# Patient Record
Sex: MALE | Race: WHITE | NOT HISPANIC OR LATINO | URBAN - METROPOLITAN AREA
[De-identification: names, ages, dates, MRNs, and addresses within clinical notes are randomized per-mention and may not be internally consistent; named-entity substitution may affect disease eponyms.]

---

## 2021-07-05 ENCOUNTER — INPATIENT (INPATIENT)
Facility: HOSPITAL | Age: 36
LOS: 0 days | Discharge: HOME | DRG: 343 | End: 2021-07-06
Attending: SURGERY | Admitting: SURGERY
Payer: COMMERCIAL

## 2021-07-05 VITALS
HEIGHT: 72 IN | DIASTOLIC BLOOD PRESSURE: 75 MMHG | OXYGEN SATURATION: 98 % | WEIGHT: 171.96 LBS | SYSTOLIC BLOOD PRESSURE: 142 MMHG | TEMPERATURE: 98 F | RESPIRATION RATE: 18 BRPM | HEART RATE: 64 BPM

## 2021-07-05 LAB
ALBUMIN SERPL ELPH-MCNC: 4.2 G/DL — SIGNIFICANT CHANGE UP (ref 3.4–5)
ALP SERPL-CCNC: 51 U/L — SIGNIFICANT CHANGE UP (ref 40–120)
ALT FLD-CCNC: 32 U/L — SIGNIFICANT CHANGE UP (ref 12–42)
ANION GAP SERPL CALC-SCNC: 9 MMOL/L — SIGNIFICANT CHANGE UP (ref 9–16)
APPEARANCE UR: CLEAR — SIGNIFICANT CHANGE UP
APTT BLD: 32.7 SEC — SIGNIFICANT CHANGE UP (ref 27.5–35.5)
AST SERPL-CCNC: 24 U/L — SIGNIFICANT CHANGE UP (ref 15–37)
BASOPHILS # BLD AUTO: 0.03 K/UL — SIGNIFICANT CHANGE UP (ref 0–0.2)
BASOPHILS NFR BLD AUTO: 0.3 % — SIGNIFICANT CHANGE UP (ref 0–2)
BILIRUB SERPL-MCNC: 0.4 MG/DL — SIGNIFICANT CHANGE UP (ref 0.2–1.2)
BILIRUB UR-MCNC: NEGATIVE — SIGNIFICANT CHANGE UP
BUN SERPL-MCNC: 8 MG/DL — SIGNIFICANT CHANGE UP (ref 7–23)
CALCIUM SERPL-MCNC: 9.1 MG/DL — SIGNIFICANT CHANGE UP (ref 8.5–10.5)
CHLORIDE SERPL-SCNC: 103 MMOL/L — SIGNIFICANT CHANGE UP (ref 96–108)
CO2 SERPL-SCNC: 28 MMOL/L — SIGNIFICANT CHANGE UP (ref 22–31)
COLOR SPEC: YELLOW — SIGNIFICANT CHANGE UP
CREAT SERPL-MCNC: 0.93 MG/DL — SIGNIFICANT CHANGE UP (ref 0.5–1.3)
DIFF PNL FLD: NEGATIVE — SIGNIFICANT CHANGE UP
EOSINOPHIL # BLD AUTO: 0.04 K/UL — SIGNIFICANT CHANGE UP (ref 0–0.5)
EOSINOPHIL NFR BLD AUTO: 0.4 % — SIGNIFICANT CHANGE UP (ref 0–6)
GLUCOSE SERPL-MCNC: 134 MG/DL — HIGH (ref 70–99)
GLUCOSE UR QL: NEGATIVE — SIGNIFICANT CHANGE UP
HCT VFR BLD CALC: 41.1 % — SIGNIFICANT CHANGE UP (ref 39–50)
HGB BLD-MCNC: 14 G/DL — SIGNIFICANT CHANGE UP (ref 13–17)
IMM GRANULOCYTES NFR BLD AUTO: 0.4 % — SIGNIFICANT CHANGE UP (ref 0–1.5)
INR BLD: 1.04 — SIGNIFICANT CHANGE UP (ref 0.88–1.16)
KETONES UR-MCNC: NEGATIVE — SIGNIFICANT CHANGE UP
LACTATE SERPL-SCNC: 1.7 MMOL/L — SIGNIFICANT CHANGE UP (ref 0.4–2)
LEUKOCYTE ESTERASE UR-ACNC: NEGATIVE — SIGNIFICANT CHANGE UP
LYMPHOCYTES # BLD AUTO: 1.06 K/UL — SIGNIFICANT CHANGE UP (ref 1–3.3)
LYMPHOCYTES # BLD AUTO: 9.3 % — LOW (ref 13–44)
MCHC RBC-ENTMCNC: 30.3 PG — SIGNIFICANT CHANGE UP (ref 27–34)
MCHC RBC-ENTMCNC: 34.1 GM/DL — SIGNIFICANT CHANGE UP (ref 32–36)
MCV RBC AUTO: 89 FL — SIGNIFICANT CHANGE UP (ref 80–100)
MONOCYTES # BLD AUTO: 0.49 K/UL — SIGNIFICANT CHANGE UP (ref 0–0.9)
MONOCYTES NFR BLD AUTO: 4.3 % — SIGNIFICANT CHANGE UP (ref 2–14)
NEUTROPHILS # BLD AUTO: 9.74 K/UL — HIGH (ref 1.8–7.4)
NEUTROPHILS NFR BLD AUTO: 85.3 % — HIGH (ref 43–77)
NITRITE UR-MCNC: NEGATIVE — SIGNIFICANT CHANGE UP
NRBC # BLD: 0 /100 WBCS — SIGNIFICANT CHANGE UP (ref 0–0)
PH UR: 6 — SIGNIFICANT CHANGE UP (ref 5–8)
PLATELET # BLD AUTO: 197 K/UL — SIGNIFICANT CHANGE UP (ref 150–400)
POTASSIUM SERPL-MCNC: 4.3 MMOL/L — SIGNIFICANT CHANGE UP (ref 3.5–5.3)
POTASSIUM SERPL-SCNC: 4.3 MMOL/L — SIGNIFICANT CHANGE UP (ref 3.5–5.3)
PROT SERPL-MCNC: 7.8 G/DL — SIGNIFICANT CHANGE UP (ref 6.4–8.2)
PROT UR-MCNC: NEGATIVE MG/DL — SIGNIFICANT CHANGE UP
PROTHROM AB SERPL-ACNC: 12.2 SEC — SIGNIFICANT CHANGE UP (ref 10.6–13.6)
RBC # BLD: 4.62 M/UL — SIGNIFICANT CHANGE UP (ref 4.2–5.8)
RBC # FLD: 11.6 % — SIGNIFICANT CHANGE UP (ref 10.3–14.5)
SARS-COV-2 RNA SPEC QL NAA+PROBE: SIGNIFICANT CHANGE UP
SODIUM SERPL-SCNC: 140 MMOL/L — SIGNIFICANT CHANGE UP (ref 132–145)
SP GR SPEC: <=1.005 — SIGNIFICANT CHANGE UP (ref 1–1.03)
UROBILINOGEN FLD QL: 0.2 E.U./DL — SIGNIFICANT CHANGE UP
WBC # BLD: 11.41 K/UL — HIGH (ref 3.8–10.5)
WBC # FLD AUTO: 11.41 K/UL — HIGH (ref 3.8–10.5)

## 2021-07-05 PROCEDURE — 74177 CT ABD & PELVIS W/CONTRAST: CPT | Mod: 26

## 2021-07-05 PROCEDURE — 99285 EMERGENCY DEPT VISIT HI MDM: CPT

## 2021-07-05 RX ORDER — SODIUM CHLORIDE 9 MG/ML
1000 INJECTION INTRAMUSCULAR; INTRAVENOUS; SUBCUTANEOUS
Refills: 0 | Status: DISCONTINUED | OUTPATIENT
Start: 2021-07-05 | End: 2021-07-06

## 2021-07-05 RX ORDER — CEFOTETAN DISODIUM 1 G
2 VIAL (EA) INJECTION ONCE
Refills: 0 | Status: COMPLETED | OUTPATIENT
Start: 2021-07-05 | End: 2021-07-05

## 2021-07-05 RX ORDER — MORPHINE SULFATE 50 MG/1
4 CAPSULE, EXTENDED RELEASE ORAL ONCE
Refills: 0 | Status: DISCONTINUED | OUTPATIENT
Start: 2021-07-05 | End: 2021-07-05

## 2021-07-05 RX ORDER — SODIUM CHLORIDE 9 MG/ML
1000 INJECTION INTRAMUSCULAR; INTRAVENOUS; SUBCUTANEOUS ONCE
Refills: 0 | Status: COMPLETED | OUTPATIENT
Start: 2021-07-05 | End: 2021-07-05

## 2021-07-05 RX ORDER — KETOROLAC TROMETHAMINE 30 MG/ML
15 SYRINGE (ML) INJECTION ONCE
Refills: 0 | Status: DISCONTINUED | OUTPATIENT
Start: 2021-07-05 | End: 2021-07-05

## 2021-07-05 RX ORDER — IOHEXOL 300 MG/ML
30 INJECTION, SOLUTION INTRAVENOUS ONCE
Refills: 0 | Status: COMPLETED | OUTPATIENT
Start: 2021-07-05 | End: 2021-07-05

## 2021-07-05 RX ADMIN — IOHEXOL 30 MILLILITER(S): 300 INJECTION, SOLUTION INTRAVENOUS at 17:35

## 2021-07-05 RX ADMIN — Medication 100 GRAM(S): at 22:33

## 2021-07-05 RX ADMIN — MORPHINE SULFATE 4 MILLIGRAM(S): 50 CAPSULE, EXTENDED RELEASE ORAL at 21:43

## 2021-07-05 RX ADMIN — SODIUM CHLORIDE 100 MILLILITER(S): 9 INJECTION INTRAMUSCULAR; INTRAVENOUS; SUBCUTANEOUS at 22:33

## 2021-07-05 RX ADMIN — MORPHINE SULFATE 4 MILLIGRAM(S): 50 CAPSULE, EXTENDED RELEASE ORAL at 22:41

## 2021-07-05 RX ADMIN — MORPHINE SULFATE 4 MILLIGRAM(S): 50 CAPSULE, EXTENDED RELEASE ORAL at 17:35

## 2021-07-05 RX ADMIN — SODIUM CHLORIDE 1000 MILLILITER(S): 9 INJECTION INTRAMUSCULAR; INTRAVENOUS; SUBCUTANEOUS at 17:35

## 2021-07-05 NOTE — ED PROVIDER NOTE - OBJECTIVE STATEMENT
36 yo male, present to the ER with his partner,  with one day of abdominal pain started in umbilical region now in RLQ  + nausea no vomiting no fever   no prior abd surgery

## 2021-07-05 NOTE — ED ADULT TRIAGE NOTE - CHIEF COMPLAINT QUOTE
sent in from [facility] for further evaluation of sudden onset of periumbilical pain, which has developed to generalized abdominal pain with nausea. denies vomiting or diarrhea

## 2021-07-05 NOTE — ED ADULT NURSE REASSESSMENT NOTE - NS ED NURSE REASSESS COMMENT FT1
Pt rcvd from [facility].  Pt w/ confirmed appy.  Pt pending abx and fluids and tx.  Will reassess and reevaluate.

## 2021-07-05 NOTE — ED PROVIDER NOTE - CLINICAL SUMMARY MEDICAL DECISION MAKING FREE TEXT BOX
@8pm the patient was endorsed to Dr. Pederson to check ct a/p for r/o appendicitis.   at that time,  the patient was comfortable - no progression of symptoms.

## 2021-07-06 VITALS
OXYGEN SATURATION: 100 % | HEART RATE: 74 BPM | SYSTOLIC BLOOD PRESSURE: 132 MMHG | DIASTOLIC BLOOD PRESSURE: 76 MMHG | RESPIRATION RATE: 16 BRPM | TEMPERATURE: 98 F

## 2021-07-06 LAB
AMPHET UR-MCNC: NEGATIVE — SIGNIFICANT CHANGE UP
ANION GAP SERPL CALC-SCNC: 9 MMOL/L — SIGNIFICANT CHANGE UP (ref 5–17)
BARBITURATES UR SCN-MCNC: NEGATIVE — SIGNIFICANT CHANGE UP
BASOPHILS # BLD AUTO: 0.02 K/UL — SIGNIFICANT CHANGE UP (ref 0–0.2)
BASOPHILS NFR BLD AUTO: 0.2 % — SIGNIFICANT CHANGE UP (ref 0–2)
BENZODIAZ UR-MCNC: NEGATIVE — SIGNIFICANT CHANGE UP
BLD GP AB SCN SERPL QL: NEGATIVE — SIGNIFICANT CHANGE UP
BUN SERPL-MCNC: 6 MG/DL — LOW (ref 7–23)
CALCIUM SERPL-MCNC: 9.3 MG/DL — SIGNIFICANT CHANGE UP (ref 8.4–10.5)
CHLORIDE SERPL-SCNC: 102 MMOL/L — SIGNIFICANT CHANGE UP (ref 96–108)
CO2 SERPL-SCNC: 29 MMOL/L — SIGNIFICANT CHANGE UP (ref 22–31)
COCAINE METAB.OTHER UR-MCNC: NEGATIVE — SIGNIFICANT CHANGE UP
COVID-19 SPIKE DOMAIN AB INTERP: POSITIVE
COVID-19 SPIKE DOMAIN ANTIBODY RESULT: >250 U/ML — HIGH
CREAT SERPL-MCNC: 0.82 MG/DL — SIGNIFICANT CHANGE UP (ref 0.5–1.3)
EOSINOPHIL # BLD AUTO: 0.07 K/UL — SIGNIFICANT CHANGE UP (ref 0–0.5)
EOSINOPHIL NFR BLD AUTO: 0.6 % — SIGNIFICANT CHANGE UP (ref 0–6)
GLUCOSE SERPL-MCNC: 111 MG/DL — HIGH (ref 70–99)
HCT VFR BLD CALC: 41.3 % — SIGNIFICANT CHANGE UP (ref 39–50)
HGB BLD-MCNC: 13.7 G/DL — SIGNIFICANT CHANGE UP (ref 13–17)
IMM GRANULOCYTES NFR BLD AUTO: 0.3 % — SIGNIFICANT CHANGE UP (ref 0–1.5)
LYMPHOCYTES # BLD AUTO: 1.58 K/UL — SIGNIFICANT CHANGE UP (ref 1–3.3)
LYMPHOCYTES # BLD AUTO: 13.8 % — SIGNIFICANT CHANGE UP (ref 13–44)
MAGNESIUM SERPL-MCNC: 1.8 MG/DL — SIGNIFICANT CHANGE UP (ref 1.6–2.6)
MCHC RBC-ENTMCNC: 29.7 PG — SIGNIFICANT CHANGE UP (ref 27–34)
MCHC RBC-ENTMCNC: 33.2 GM/DL — SIGNIFICANT CHANGE UP (ref 32–36)
MCV RBC AUTO: 89.4 FL — SIGNIFICANT CHANGE UP (ref 80–100)
METHADONE UR-MCNC: NEGATIVE — SIGNIFICANT CHANGE UP
MONOCYTES # BLD AUTO: 1.01 K/UL — HIGH (ref 0–0.9)
MONOCYTES NFR BLD AUTO: 8.8 % — SIGNIFICANT CHANGE UP (ref 2–14)
NEUTROPHILS # BLD AUTO: 8.76 K/UL — HIGH (ref 1.8–7.4)
NEUTROPHILS NFR BLD AUTO: 76.3 % — SIGNIFICANT CHANGE UP (ref 43–77)
NRBC # BLD: 0 /100 WBCS — SIGNIFICANT CHANGE UP (ref 0–0)
OPIATES UR-MCNC: POSITIVE
PCP SPEC-MCNC: SIGNIFICANT CHANGE UP
PCP UR-MCNC: NEGATIVE — SIGNIFICANT CHANGE UP
PHOSPHATE SERPL-MCNC: 3.5 MG/DL — SIGNIFICANT CHANGE UP (ref 2.5–4.5)
PLATELET # BLD AUTO: 181 K/UL — SIGNIFICANT CHANGE UP (ref 150–400)
POTASSIUM SERPL-MCNC: 4.8 MMOL/L — SIGNIFICANT CHANGE UP (ref 3.5–5.3)
POTASSIUM SERPL-SCNC: 4.8 MMOL/L — SIGNIFICANT CHANGE UP (ref 3.5–5.3)
RBC # BLD: 4.62 M/UL — SIGNIFICANT CHANGE UP (ref 4.2–5.8)
RBC # FLD: 11.9 % — SIGNIFICANT CHANGE UP (ref 10.3–14.5)
RH IG SCN BLD-IMP: POSITIVE — SIGNIFICANT CHANGE UP
RH IG SCN BLD-IMP: POSITIVE — SIGNIFICANT CHANGE UP
SARS-COV-2 IGG+IGM SERPL QL IA: >250 U/ML — HIGH
SARS-COV-2 IGG+IGM SERPL QL IA: POSITIVE
SODIUM SERPL-SCNC: 140 MMOL/L — SIGNIFICANT CHANGE UP (ref 135–145)
THC UR QL: NEGATIVE — SIGNIFICANT CHANGE UP
WBC # BLD: 11.48 K/UL — HIGH (ref 3.8–10.5)
WBC # FLD AUTO: 11.48 K/UL — HIGH (ref 3.8–10.5)

## 2021-07-06 PROCEDURE — 74177 CT ABD & PELVIS W/CONTRAST: CPT

## 2021-07-06 PROCEDURE — 36415 COLL VENOUS BLD VENIPUNCTURE: CPT

## 2021-07-06 PROCEDURE — 87635 SARS-COV-2 COVID-19 AMP PRB: CPT

## 2021-07-06 PROCEDURE — 71045 X-RAY EXAM CHEST 1 VIEW: CPT | Mod: 26

## 2021-07-06 PROCEDURE — 85730 THROMBOPLASTIN TIME PARTIAL: CPT

## 2021-07-06 PROCEDURE — 80307 DRUG TEST PRSMV CHEM ANLYZR: CPT

## 2021-07-06 PROCEDURE — 85025 COMPLETE CBC W/AUTO DIFF WBC: CPT

## 2021-07-06 PROCEDURE — 80053 COMPREHEN METABOLIC PANEL: CPT

## 2021-07-06 PROCEDURE — 86769 SARS-COV-2 COVID-19 ANTIBODY: CPT

## 2021-07-06 PROCEDURE — 83605 ASSAY OF LACTIC ACID: CPT

## 2021-07-06 PROCEDURE — 86850 RBC ANTIBODY SCREEN: CPT

## 2021-07-06 PROCEDURE — 83735 ASSAY OF MAGNESIUM: CPT

## 2021-07-06 PROCEDURE — 81003 URINALYSIS AUTO W/O SCOPE: CPT

## 2021-07-06 PROCEDURE — 86900 BLOOD TYPING SEROLOGIC ABO: CPT

## 2021-07-06 PROCEDURE — 80048 BASIC METABOLIC PNL TOTAL CA: CPT

## 2021-07-06 PROCEDURE — 88304 TISSUE EXAM BY PATHOLOGIST: CPT

## 2021-07-06 PROCEDURE — 84100 ASSAY OF PHOSPHORUS: CPT

## 2021-07-06 PROCEDURE — 86901 BLOOD TYPING SEROLOGIC RH(D): CPT

## 2021-07-06 PROCEDURE — C1889: CPT

## 2021-07-06 PROCEDURE — 99285 EMERGENCY DEPT VISIT HI MDM: CPT

## 2021-07-06 PROCEDURE — 85610 PROTHROMBIN TIME: CPT

## 2021-07-06 PROCEDURE — 88304 TISSUE EXAM BY PATHOLOGIST: CPT | Mod: 26

## 2021-07-06 PROCEDURE — 71045 X-RAY EXAM CHEST 1 VIEW: CPT

## 2021-07-06 RX ORDER — OXYCODONE AND ACETAMINOPHEN 5; 325 MG/1; MG/1
1 TABLET ORAL EVERY 4 HOURS
Refills: 0 | Status: DISCONTINUED | OUTPATIENT
Start: 2021-07-06 | End: 2021-07-06

## 2021-07-06 RX ORDER — CEFTRIAXONE 500 MG/1
1000 INJECTION, POWDER, FOR SOLUTION INTRAMUSCULAR; INTRAVENOUS EVERY 24 HOURS
Refills: 0 | Status: DISCONTINUED | OUTPATIENT
Start: 2021-07-07 | End: 2021-07-06

## 2021-07-06 RX ORDER — CEFTRIAXONE 500 MG/1
1000 INJECTION, POWDER, FOR SOLUTION INTRAMUSCULAR; INTRAVENOUS EVERY 24 HOURS
Refills: 0 | Status: DISCONTINUED | OUTPATIENT
Start: 2021-07-06 | End: 2021-07-06

## 2021-07-06 RX ORDER — OXYCODONE HYDROCHLORIDE 5 MG/1
1 TABLET ORAL
Qty: 12 | Refills: 0
Start: 2021-07-06 | End: 2021-07-08

## 2021-07-06 RX ORDER — ONDANSETRON 8 MG/1
4 TABLET, FILM COATED ORAL EVERY 6 HOURS
Refills: 0 | Status: DISCONTINUED | OUTPATIENT
Start: 2021-07-06 | End: 2021-07-06

## 2021-07-06 RX ORDER — SODIUM CHLORIDE 9 MG/ML
1000 INJECTION, SOLUTION INTRAVENOUS
Refills: 0 | Status: DISCONTINUED | OUTPATIENT
Start: 2021-07-06 | End: 2021-07-06

## 2021-07-06 RX ORDER — HYDROMORPHONE HYDROCHLORIDE 2 MG/ML
0.5 INJECTION INTRAMUSCULAR; INTRAVENOUS; SUBCUTANEOUS
Refills: 0 | Status: DISCONTINUED | OUTPATIENT
Start: 2021-07-06 | End: 2021-07-06

## 2021-07-06 RX ORDER — HYDROMORPHONE HYDROCHLORIDE 2 MG/ML
0.5 INJECTION INTRAMUSCULAR; INTRAVENOUS; SUBCUTANEOUS EVERY 6 HOURS
Refills: 0 | Status: DISCONTINUED | OUTPATIENT
Start: 2021-07-06 | End: 2021-07-06

## 2021-07-06 RX ORDER — HYDROMORPHONE HYDROCHLORIDE 2 MG/ML
0.25 INJECTION INTRAMUSCULAR; INTRAVENOUS; SUBCUTANEOUS EVERY 6 HOURS
Refills: 0 | Status: DISCONTINUED | OUTPATIENT
Start: 2021-07-06 | End: 2021-07-06

## 2021-07-06 RX ORDER — HEPARIN SODIUM 5000 [USP'U]/ML
5000 INJECTION INTRAVENOUS; SUBCUTANEOUS EVERY 8 HOURS
Refills: 0 | Status: DISCONTINUED | OUTPATIENT
Start: 2021-07-06 | End: 2021-07-06

## 2021-07-06 RX ORDER — METRONIDAZOLE 500 MG
500 TABLET ORAL EVERY 8 HOURS
Refills: 0 | Status: DISCONTINUED | OUTPATIENT
Start: 2021-07-06 | End: 2021-07-06

## 2021-07-06 RX ORDER — OXYCODONE AND ACETAMINOPHEN 5; 325 MG/1; MG/1
2 TABLET ORAL EVERY 4 HOURS
Refills: 0 | Status: DISCONTINUED | OUTPATIENT
Start: 2021-07-06 | End: 2021-07-06

## 2021-07-06 RX ORDER — ACETAMINOPHEN 500 MG
1000 TABLET ORAL EVERY 6 HOURS
Refills: 0 | Status: COMPLETED | OUTPATIENT
Start: 2021-07-06 | End: 2021-07-06

## 2021-07-06 RX ORDER — MAGNESIUM SULFATE 500 MG/ML
1 VIAL (ML) INJECTION ONCE
Refills: 0 | Status: DISCONTINUED | OUTPATIENT
Start: 2021-07-06 | End: 2021-07-06

## 2021-07-06 RX ORDER — FINASTERIDE 5 MG/1
1 TABLET, FILM COATED ORAL
Qty: 0 | Refills: 0 | DISCHARGE

## 2021-07-06 RX ADMIN — HYDROMORPHONE HYDROCHLORIDE 0.5 MILLIGRAM(S): 2 INJECTION INTRAMUSCULAR; INTRAVENOUS; SUBCUTANEOUS at 11:50

## 2021-07-06 RX ADMIN — OXYCODONE AND ACETAMINOPHEN 1 TABLET(S): 5; 325 TABLET ORAL at 15:14

## 2021-07-06 RX ADMIN — Medication 400 MILLIGRAM(S): at 01:11

## 2021-07-06 RX ADMIN — SODIUM CHLORIDE 120 MILLILITER(S): 9 INJECTION, SOLUTION INTRAVENOUS at 01:11

## 2021-07-06 RX ADMIN — HYDROMORPHONE HYDROCHLORIDE 0.5 MILLIGRAM(S): 2 INJECTION INTRAMUSCULAR; INTRAVENOUS; SUBCUTANEOUS at 11:30

## 2021-07-06 RX ADMIN — Medication 100 MILLIGRAM(S): at 13:25

## 2021-07-06 RX ADMIN — HYDROMORPHONE HYDROCHLORIDE 0.25 MILLIGRAM(S): 2 INJECTION INTRAMUSCULAR; INTRAVENOUS; SUBCUTANEOUS at 05:08

## 2021-07-06 RX ADMIN — HYDROMORPHONE HYDROCHLORIDE 0.25 MILLIGRAM(S): 2 INJECTION INTRAMUSCULAR; INTRAVENOUS; SUBCUTANEOUS at 05:28

## 2021-07-06 RX ADMIN — CEFTRIAXONE 100 MILLIGRAM(S): 500 INJECTION, POWDER, FOR SOLUTION INTRAMUSCULAR; INTRAVENOUS at 03:00

## 2021-07-06 RX ADMIN — HEPARIN SODIUM 5000 UNIT(S): 5000 INJECTION INTRAVENOUS; SUBCUTANEOUS at 13:25

## 2021-07-06 RX ADMIN — OXYCODONE AND ACETAMINOPHEN 1 TABLET(S): 5; 325 TABLET ORAL at 16:14

## 2021-07-06 RX ADMIN — Medication 1000 MILLIGRAM(S): at 01:41

## 2021-07-06 RX ADMIN — HEPARIN SODIUM 5000 UNIT(S): 5000 INJECTION INTRAVENOUS; SUBCUTANEOUS at 05:08

## 2021-07-06 RX ADMIN — Medication 100 MILLIGRAM(S): at 05:08

## 2021-07-06 NOTE — DISCHARGE NOTE NURSING/CASE MANAGEMENT/SOCIAL WORK - PATIENT PORTAL LINK FT
You can access the FollowMyHealth Patient Portal offered by [facility] by registering at the following website: [email or web address]. By joining [facility] FollowMyHealth portal, you will also be able to view your health information using other applications (apps) compatible with our system.

## 2021-07-06 NOTE — H&P ADULT - NSHPPHYSICALEXAM_GEN_ALL_CORE
Physical Exam:  General: NAD, resting comfortably in bed  Pulmonary: Nonlabored, no respiratory distress  Cardiovascular: regular rate and rhythm   Abdominal: soft, not distended. Minimally tender in LUQ/RUQ, Significant pain to palpation in RLQ. +Rovsing sign.   Extremities: WWP, normal strength  Neuro: A/O x 3, no focal deficits, normal motor/sensation  Pulses: palpable distal pulses

## 2021-07-06 NOTE — H&P ADULT - HISTORY OF PRESENT ILLNESS
Mr. Sevilla is a 36yo male with no sig PMH or PSurHX  who presented to the [facility] ED with a 1 day history of abdominal pain rated 9/10, sharp, beginning in upper abdomen region radiating to the RLQ. Pain initially awoke patient from sleep last evening. Endorses nausea without vomiting and anorexia. Denies Fever, chills, headache, chest pain, dyspnea, diarrhea, constipation, or recent weight loss. Has never experienced similar symptoms. Recent travel from [place]. No sick contacts. Never had a colonoscopy or EGD.    PMHx: none  PSHx: none  Meds: propecia  Allergies: NKDA  Social Hx: never smoker. Occasional ETOH use. Occasional cocaine use, >48hrs ago. Works as an .   Family Hx: Mom and Dad both alive, Dad: heart disease. No family history of IBD    In the ED, patient was hemodynamically stable T98.1 HR 64 142/65. Laboratory studies significant for WBC of 11k, LA1.7, CTAP demonstrating thickened appending up to 0.8 cm with periappendiceal fat stranding and free fluid. No appendicolith appreciated. No periappendiceal abscess collection or extraluminal gas. Administered cefotetan, morphine 4mg x2 and 1.5L NS bolus.       Plan  Admit to inpatient, Surgery Team 4, Dr. Shearer  NPO/IVF  Ceftriaxone/MTD  Pain/nausea control  SCD/SQH  IS/OOBAT  Type and Screen  AM Labs  Add on for OR Mr. Sevilla is a 34yo male with no sig PMH or PSurHX  who presented to the [facility] ED with a 1 day history of abdominal pain rated 9/10, sharp, beginning in upper abdomen region radiating to the RLQ. Pain initially awoke patient from sleep last evening. Endorses nausea without vomiting and anorexia. Denies Fever, chills, headache, chest pain, dyspnea, diarrhea, constipation, or recent weight loss. Has never experienced similar symptoms. Recent travel from [place]. No sick contacts. Never had a colonoscopy or EGD.    PMHx: none  PSHx: none  Meds: propecia  Allergies: NKDA  Social Hx: never smoker. Occasional ETOH use. Occasional cocaine use, >48hrs ago. Works as an .   Family Hx: Mom and Dad both alive, Dad: heart disease. No family history of IBD    In the ED, patient was hemodynamically stable T98.1 HR 64 142/65. Laboratory studies significant for WBC of 11k, LA1.7, CTAP demonstrating thickened appending up to 0.8 cm with periappendiceal fat stranding and free fluid. No appendicolith noted. Administered cefotetan, morphine 4mg x2 and 1.5L NS bolus.

## 2021-07-06 NOTE — DISCHARGE NOTE PROVIDER - NSDCMRMEDTOKEN_GEN_ALL_CORE_FT
Propecia 1 mg oral tablet: 1 tab(s) orally once a day   amoxicillin-clavulanate 875 mg-125 mg oral tablet: 1 tab(s) orally 2 times a day  Oxaydo 5 mg oral tablet: 1 tab(s) orally every 6 hours, As Needed -for severe pain  -for severe pain MDD:4 tablets   Propecia 1 mg oral tablet: 1 tab(s) orally once a day   amoxicillin-clavulanate 875 mg-125 mg oral tablet: 1 tab(s) orally 2 times a day  oxyCODONE 5 mg oral tablet: 1 tab(s) orally every 6 hours, As Needed -for severe pain MDD:4   Propecia 1 mg oral tablet: 1 tab(s) orally once a day

## 2021-07-06 NOTE — H&P ADULT - ASSESSMENT
Mr. Sevilla is a 36yo male with no sig PMH or PSurgHx who presented to the [facility] ED with a 1 day history of abdominal pain, nausea without vomiting and anorexia. Pt is HDS, exam significant for pain in the RLQ, positive Rovsing sign. Laboratory studies significant for WBC of 11k, LA1.7, CTAP consistent with acute appendicitis without abscess or appendicolith. Impression is acute appendicitis.     Plan  Admit to inpatient, Surgery Team 4, Dr. Shearer  NPO/IVF  Ceftriaxone/MTD  Pain/nausea control  SCD/SQH  IS/OOBAT  Type and Screen  EKG  AM Labs  Add on for OR   Mr. Sevilla is a 36yo male with no sig PMH or PSurgHx who presented to the [facility] ED with a 1 day history of abdominal pain, nausea without vomiting and anorexia. Pt is HDS, exam significant for pain in the RLQ, positive Rovsing sign. Laboratory studies significant for WBC of 11k, LA1.7, CTAP consistent with acute appendicitis without abscess or appendicolith. Impression is acute appendicitis.     Plan  Admit to inpatient, Surgery Team 4, Dr. Shearer  NPO/IVF  Ceftriaxone/MTD  Pain/nausea control  SCD/SQH  IS/OOBAT  Type and Screen  EKG  AM Labs  Add on for OR    Patient s/d/e with Chief Resident   Mr. Sevilla is a 36yo male with no sig PMH or PSurgHx who presented to the [facility] ED with a 1 day history of abdominal pain, nausea without vomiting and anorexia. Pt is HDS, exam significant for pain in the RLQ, positive Rovsing sign. Laboratory studies significant for WBC of 11k, LA1.7, CTAP consistent with acute appendicitis without abscess or appendicolith. Impression is acute appendicitis.     Plan  Admit to inpatient, Surgery Team 4, Dr. Seharer  NPO/IVF  Ceftriaxone/MTD  Pain/nausea control  SCD/SQH  IS/OOBAT  Type and Screen  EKG  CXR  AM Labs  Add on for OR    Patient s/d/e with Chief Resident

## 2021-07-06 NOTE — BRIEF OPERATIVE NOTE - OPERATION/FINDINGS
Patient was brought to the operating room on 7/6 for an appendectomy.  Appendix was found to be inflamed and attached to the ascending colon. It was carefully dissected and mobilized away from the colon. The mesentery was divided with a ligasure and the appendix was stapled at the base with a 45 mm purple load stapler. Hemostasis was achieved and the appendix was removed without incident. Port sites were closed and covered with steri-strips. Patient tolerated the procedure well with no known complications.

## 2021-07-06 NOTE — DISCHARGE NOTE PROVIDER - HOSPITAL COURSE
Mr. Sevilla is a 36yo male with no sig PMH or PSurHX  who presented to the [facility] ED with a 1 day history of abdominal pain rated 9/10, sharp, beginning in upper abdomen region radiating to the RLQ. Pain initially awoke patient from sleep. He endorsed nausea without vomiting and anorexia. Denied fever, chills, headache, chest pain, dyspnea, diarrhea, constipation, or recent weight loss. Laboratory studies demonstrated a WBC count of 11k, and a CT scan was consistent with a diagnosis of acute appendicitis. The patient was admitted to the surgical service, made nil per os, administered IV fluids and antibiotics and appropriate preoperative optimization was carried out. He proceeded to the operating room for laparoscopic appendectomy on 7/6. **********INCOMPLETE********* The patient's clinical picture has improved. Upon discharge he remains hemodynamically stable and is ready for discharge with instructions to follow up with the surgeon.      Mr. Sevilla is a 34yo male with no sig PMH or PSurHX  who presented to the [facility] ED with a 1 day history of abdominal pain rated 9/10, sharp, beginning in upper abdomen region radiating to the RLQ. Pain initially awoke patient from sleep. He endorsed nausea without vomiting and anorexia. Denied fever, chills, headache, chest pain, dyspnea, diarrhea, constipation, or recent weight loss. Laboratory studies demonstrated a WBC count of 11k, and a CT scan was consistent with a diagnosis of acute appendicitis. The patient was admitted to the surgical service, made nil per os, administered IV fluids and antibiotics and appropriate preoperative optimization was carried out. He proceeded to the operating room for laparoscopic appendectomy on 7/6. Patient tolerated the procedure well with no complications. Some inflammation of the appendix was noted and the decision was made to continue the patient on 4 days of outpatient antibiotics. The patient's clinical picture has improved. Upon discharge he remains hemodynamically stable and is ready for discharge with instructions to follow up with the surgeon.

## 2021-07-06 NOTE — CHART NOTE - NSCHARTNOTEFT_GEN_A_CORE
Patient seen post-op at bedside. Reports 3/10 abdominal pain that is well-controlled. Denies nausea, vomiting, fever, chills, CP, SOB. Abdomen is soft, nondistended, and appropriately tender. Incisions intact with slight oozing through steri strips. Team 4 Surgery Post-Op Note, PCN:     Pre-Op Dx:   Procedure: Appendectomy      Surgeon: Dr. Sherley Rossi    Subjective: Patient seen post-op at bedside. Reports 3/10 abdominal pain that is well-controlled. Denies nausea, vomiting, fever, chills, CP, SOB.    Vital Signs Last 24 Hrs  T(C): 36.9 (06 Jul 2021 12:32), Max: 37.3 (06 Jul 2021 02:52)  T(F): 98.5 (06 Jul 2021 12:32), Max: 99.2 (06 Jul 2021 02:52)  HR: 74 (06 Jul 2021 12:32) (56 - 74)  BP: 132/76 (06 Jul 2021 12:32) (114/66 - 146/88)  BP(mean): 91 (06 Jul 2021 11:50) (90 - 93)  RR: 16 (06 Jul 2021 12:32) (16 - 23)  SpO2: 100% (06 Jul 2021 12:32) (98% - 100%)    Physical Exam:  General: NAD, resting comfortably in bed  Pulmonary: Nonlabored breathing, no respiratory distress  Cardiovascular: NSR  Abdominal: soft, ND, appropriately tender. Incisions intact with slight oozing through steri strips without erythema.  Extremities: WWP, normal strength    LABS:                        13.7   11.48 )-----------( 181      ( 06 Jul 2021 06:31 )             41.3     07-06    140  |  102  |  6<L>  ----------------------------<  111<H>  4.8   |  29  |  0.82    Ca    9.3      06 Jul 2021 06:31  Phos  3.5     07-06  Mg     1.8     07-06    TPro  7.8  /  Alb  4.2  /  TBili  0.4  /  DBili  x   /  AST  24  /  ALT  32  /  AlkPhos  51  07-05    PT/INR - ( 05 Jul 2021 22:14 )   PT: 12.2 sec;   INR: 1.04          PTT - ( 05 Jul 2021 22:14 )  PTT:32.7 sec  CAPILLARY BLOOD GLUCOSE        Urinalysis Basic - ( 05 Jul 2021 18:36 )    Color: Yellow / Appearance: Clear / SG: <=1.005 / pH: x  Gluc: x / Ketone: NEGATIVE  / Bili: NEGATIVE / Urobili: 0.2 E.U./dL   Blood: x / Protein: NEGATIVE mg/dL / Nitrite: NEGATIVE   Leuk Esterase: NEGATIVE / RBC: x / WBC x   Sq Epi: x / Non Sq Epi: x / Bacteria: x      LIVER FUNCTIONS - ( 05 Jul 2021 17:44 )  Alb: 4.2 g/dL / Pro: 7.8 g/dL / ALK PHOS: 51 U/L / ALT: 32 U/L / AST: 24 U/L / GGT: x           ABO Interpretation: A (07-06 @ 04:30)        Radiology and Additional Studies:    Assessment:35y Male s/p above procedure    Plan:  Pain/nausea control PRN  Home meds  Incentive spirometer/OOB/Ambulate  IVF  CLD, advance as tolerated  Discharge home    Héctor Kenny, PGY1 saw patient with MS4 Team 4 Surgery Post-Op Note, PCN:     Pre-Op Dx: Appendicitis  Post-Op Dx: Appendicitis  Procedure: Appendectomy      Surgeon: Dr. Sherley Rossi    Subjective: Patient seen post-op at bedside. Reports 3/10 abdominal pain that is well-controlled. Denies nausea, vomiting, fever, chills, CP, SOB.    Vital Signs Last 24 Hrs  T(C): 36.9 (06 Jul 2021 12:32), Max: 37.3 (06 Jul 2021 02:52)  T(F): 98.5 (06 Jul 2021 12:32), Max: 99.2 (06 Jul 2021 02:52)  HR: 74 (06 Jul 2021 12:32) (56 - 74)  BP: 132/76 (06 Jul 2021 12:32) (114/66 - 146/88)  BP(mean): 91 (06 Jul 2021 11:50) (90 - 93)  RR: 16 (06 Jul 2021 12:32) (16 - 23)  SpO2: 100% (06 Jul 2021 12:32) (98% - 100%)    Physical Exam:  General: NAD, resting comfortably in bed  Pulmonary: Nonlabored breathing, no respiratory distress  Cardiovascular: NSR  Abdominal: soft, ND, appropriately tender. Incisions intact with slight oozing through steri strips without erythema.  Extremities: WWP, normal strength    LABS:                        13.7   11.48 )-----------( 181      ( 06 Jul 2021 06:31 )             41.3     07-06    140  |  102  |  6<L>  ----------------------------<  111<H>  4.8   |  29  |  0.82    Ca    9.3      06 Jul 2021 06:31  Phos  3.5     07-06  Mg     1.8     07-06    TPro  7.8  /  Alb  4.2  /  TBili  0.4  /  DBili  x   /  AST  24  /  ALT  32  /  AlkPhos  51  07-05    PT/INR - ( 05 Jul 2021 22:14 )   PT: 12.2 sec;   INR: 1.04          PTT - ( 05 Jul 2021 22:14 )  PTT:32.7 sec  CAPILLARY BLOOD GLUCOSE        Urinalysis Basic - ( 05 Jul 2021 18:36 )    Color: Yellow / Appearance: Clear / SG: <=1.005 / pH: x  Gluc: x / Ketone: NEGATIVE  / Bili: NEGATIVE / Urobili: 0.2 E.U./dL   Blood: x / Protein: NEGATIVE mg/dL / Nitrite: NEGATIVE   Leuk Esterase: NEGATIVE / RBC: x / WBC x   Sq Epi: x / Non Sq Epi: x / Bacteria: x      LIVER FUNCTIONS - ( 05 Jul 2021 17:44 )  Alb: 4.2 g/dL / Pro: 7.8 g/dL / ALK PHOS: 51 U/L / ALT: 32 U/L / AST: 24 U/L / GGT: x           ABO Interpretation: A (07-06 @ 04:30)        Radiology and Additional Studies:    Assessment:35y Male s/p above procedure doing well in the post-op period.    Plan:  Pain/nausea control PRN  Home meds  Incentive spirometer/OOB/Ambulate  IVF  CLD, advance as tolerated  Discharge home    Héctor Kenny, PGY1 saw patient with MS4

## 2021-07-06 NOTE — DISCHARGE NOTE PROVIDER - CARE PROVIDER_API CALL
Sherley Rossi (MD)  Surgery  [address] [place] [address], [address]  [place] [address]  Phone: [phone]  Follow Up Time:    Sherley Rossi (MD)  Surgery  [address] [place] [address], [address]  [place] [address]  Phone: [phone]  Follow Up Time: 2 weeks

## 2021-07-06 NOTE — DISCHARGE NOTE PROVIDER - NSDCFUADDINST_GEN_ALL_CORE_FT
General Discharge Instructions:  Please resume all regular home medications unless specifically advised not to take a particular medication. Also, please take any new medications as prescribed.  Please get plenty of rest, continue to ambulate several times per day, and drink adequate amounts of fluids. Avoid lifting weights greater than 5-10 lbs until you follow-up with your surgeon, who will instruct you further regarding activity restrictions.  Avoid driving or operating heavy machinery while taking pain medications.  Please follow-up with your surgeon and Primary Care Provider (PCP) as advised.  Incision Care:  *Please call your doctor or nurse practitioner if you have increased pain, swelling, redness, or drainage from the incision site.  *Avoid swimming and baths until your follow-up appointment.  *You may shower, and wash surgical incisions with a mild soap and warm water. Gently pat the area dry.  *If you have staples, they will be removed at your follow-up appointment.  *If you have steri-strips, they will fall off on their own. Please remove any remaining strips 7-10 days after surgery.      Warning Signs:  Please call your doctor or nurse practitioner if you experience the following:  *You experience new chest pain, pressure, squeezing or tightness.  *New or worsening cough, shortness of breath, or wheeze.  *If you are vomiting and cannot keep down fluids or your medications.  *You are getting dehydrated due to continued vomiting, diarrhea, or other reasons. Signs of dehydration include dry mouth, rapid heartbeat, or feeling dizzy or faint when standing.  *You see blood or dark/black material when you vomit or have a bowel movement.  *You experience burning when you urinate, have blood in your urine, or experience a discharge.  *Your pain is not improving within 8-12 hours or is not gone within 24 hours. Call or return immediately if your pain is getting worse, changes location, or moves to your chest or back.  *You have shaking chills, or fever greater than 101.5 degrees Fahrenheit or 38 degrees Celsius.  *Any change in your symptoms, or any new symptoms that concern you.

## 2021-07-06 NOTE — DISCHARGE NOTE PROVIDER - NSDCACTIVITY_GEN_ALL_CORE
No restrictions/Return to Work/School allowed/Showering allowed/Stairs allowed/Driving allowed/Walking - Indoors allowed/No heavy lifting/straining/Walking - Outdoors allowed

## 2021-07-12 LAB — SURGICAL PATHOLOGY STUDY: SIGNIFICANT CHANGE UP

## 2021-07-16 DIAGNOSIS — R10.9 UNSPECIFIED ABDOMINAL PAIN: ICD-10-CM

## 2021-07-16 DIAGNOSIS — K35.80 UNSPECIFIED ACUTE APPENDICITIS: ICD-10-CM
